# Patient Record
Sex: FEMALE | Race: WHITE | NOT HISPANIC OR LATINO | URBAN - METROPOLITAN AREA
[De-identification: names, ages, dates, MRNs, and addresses within clinical notes are randomized per-mention and may not be internally consistent; named-entity substitution may affect disease eponyms.]

---

## 2020-07-22 ENCOUNTER — OFFICE VISIT (OUTPATIENT)
Dept: URBAN - METROPOLITAN AREA TELEHEALTH 2 | Facility: TELEHEALTH | Age: 65
End: 2020-07-22

## 2020-07-22 ENCOUNTER — OFFICE VISIT (OUTPATIENT)
Dept: URBAN - METROPOLITAN AREA CLINIC 63 | Facility: CLINIC | Age: 65
End: 2020-07-22

## 2020-10-01 ENCOUNTER — OFFICE VISIT (OUTPATIENT)
Dept: URBAN - METROPOLITAN AREA CLINIC 121 | Facility: CLINIC | Age: 65
End: 2020-10-01

## 2020-10-29 ENCOUNTER — OFFICE VISIT (OUTPATIENT)
Dept: URBAN - METROPOLITAN AREA CLINIC 63 | Facility: CLINIC | Age: 65
End: 2020-10-29

## 2020-11-05 ENCOUNTER — OFFICE VISIT (OUTPATIENT)
Dept: URBAN - METROPOLITAN AREA TELEHEALTH 2 | Facility: TELEHEALTH | Age: 65
End: 2020-11-05

## 2020-11-09 LAB
ABSOLUTE BASOPHILS: (no result)
ABSOLUTE EOSINOPHILS: (no result)
ABSOLUTE LYMPHOCYTES: (no result)
ABSOLUTE MONOCYTES: (no result)
ABSOLUTE NEUTROPHILS: (no result)
BASOPHILS: (no result)
EOSINOPHILS: (no result)
HEMATOCRIT: (no result)
HEMOGLOBIN: (no result)
LYMPHOCYTES: (no result)
MCH: (no result)
MCHC: (no result)
MCV: (no result)
MONOCYTES: (no result)
MPV: (no result)
NEUTROPHILS: (no result)
PLATELET COUNT: (no result)
RDW: (no result)
RED BLOOD CELL COUNT: (no result)
WHITE BLOOD CELL COUNT: (no result)

## 2021-01-28 ENCOUNTER — OFFICE VISIT (OUTPATIENT)
Dept: URBAN - METROPOLITAN AREA CLINIC 63 | Facility: CLINIC | Age: 66
End: 2021-01-28

## 2021-04-01 ENCOUNTER — OFFICE VISIT (OUTPATIENT)
Dept: URBAN - METROPOLITAN AREA CLINIC 63 | Facility: CLINIC | Age: 66
End: 2021-04-01

## 2021-04-23 ENCOUNTER — OFFICE VISIT (OUTPATIENT)
Dept: URBAN - METROPOLITAN AREA SURGERY CENTER 4 | Facility: SURGERY CENTER | Age: 66
End: 2021-04-23

## 2021-04-27 LAB — PATHOLOGY (INDENTED REPORT): (no result)

## 2021-05-13 ENCOUNTER — OFFICE VISIT (OUTPATIENT)
Dept: URBAN - METROPOLITAN AREA CLINIC 63 | Facility: CLINIC | Age: 66
End: 2021-05-13

## 2021-10-19 ENCOUNTER — OFFICE VISIT (OUTPATIENT)
Dept: URBAN - METROPOLITAN AREA CLINIC 121 | Facility: CLINIC | Age: 66
End: 2021-10-19

## 2022-02-24 ENCOUNTER — OFFICE VISIT (OUTPATIENT)
Dept: URBAN - METROPOLITAN AREA CLINIC 63 | Facility: CLINIC | Age: 67
End: 2022-02-24

## 2022-07-09 ENCOUNTER — TELEPHONE ENCOUNTER (OUTPATIENT)
Dept: URBAN - METROPOLITAN AREA CLINIC 121 | Facility: CLINIC | Age: 67
End: 2022-07-09

## 2022-07-09 RX ORDER — ROSUVASTATIN CALCIUM 20 MG/1
TABLET, FILM COATED ORAL ONCE A DAY
Refills: 0 | OUTPATIENT
Start: 2020-06-02 | End: 2022-02-24

## 2022-07-09 RX ORDER — LIDOCAINE 50 MG/G
TAKE AS DIRECTED CREAM TOPICAL TAKE AS DIRECTED
Refills: 0 | OUTPATIENT
Start: 2020-10-29 | End: 2022-02-24

## 2022-07-09 RX ORDER — FLUOXETINE HYDROCHLORIDE 20 MG/1
TABLET ORAL
Refills: 0 | OUTPATIENT
Start: 2019-11-04 | End: 2019-12-23

## 2022-07-09 RX ORDER — AMLODIPINE BESYLATE 2.5 MG/1
TABLET ORAL
Refills: 0 | OUTPATIENT
Start: 2019-11-06 | End: 2019-12-23

## 2022-07-09 RX ORDER — VALACYCLOVIR HYDROCHLORIDE 500 MG/1
PRN TABLET, FILM COATED ORAL
Refills: 0 | OUTPATIENT
Start: 2019-12-23 | End: 2020-10-29

## 2022-07-09 RX ORDER — OMEPRAZOLE 20 MG/1
ONCE A DAY ONE CAPSULE 30MIN BEFORE BREAKFAST CAPSULE, DELAYED RELEASE ORAL ONCE A DAY
Refills: 5 | OUTPATIENT
Start: 2021-05-13 | End: 2021-12-13

## 2022-07-09 RX ORDER — EZETIMIBE 10 MG/1
TABLET ORAL ONCE A DAY
Refills: 0 | OUTPATIENT
Start: 2020-06-02 | End: 2022-02-24

## 2022-07-09 RX ORDER — AMLODIPINE BESYLATE 2.5 MG/1
TABLET ORAL ONCE A DAY
Refills: 0 | OUTPATIENT
Start: 2020-05-29 | End: 2022-02-24

## 2022-07-09 RX ORDER — FAMOTIDINE 40 MG/1
TWICE A DAY TABLET, FILM COATED ORAL TWICE A DAY
Refills: 1 | OUTPATIENT
Start: 2020-07-27 | End: 2021-03-08

## 2022-07-09 RX ORDER — FLUOXETINE HYDROCHLORIDE 20 MG/1
CAPSULE ORAL ONCE A DAY
Refills: 0 | OUTPATIENT
Start: 2020-07-25 | End: 2022-02-24

## 2022-07-09 RX ORDER — VALACYCLOVIR HYDROCHLORIDE 500 MG/1
PRN TABLET, FILM COATED ORAL
Refills: 0 | OUTPATIENT
Start: 2019-12-23 | End: 2019-12-23

## 2022-07-09 RX ORDER — PANTOPRAZOLE 20 MG/1
TABLET, DELAYED RELEASE ORAL
Refills: 0 | OUTPATIENT
Start: 2019-11-06 | End: 2019-12-23

## 2022-07-09 RX ORDER — OMEPRAZOLE 20 MG/1
ONCE A DAY ONE CAPSULE 30MIN BEFORE BREAKFAST CAPSULE, DELAYED RELEASE ORAL ONCE A DAY
Refills: 1 | OUTPATIENT
Start: 2021-04-23 | End: 2021-05-13

## 2022-07-10 ENCOUNTER — TELEPHONE ENCOUNTER (OUTPATIENT)
Dept: URBAN - METROPOLITAN AREA CLINIC 121 | Facility: CLINIC | Age: 67
End: 2022-07-10

## 2022-07-10 RX ORDER — FAMOTIDINE 40 MG/1
TAKE ONE TABLET BY MOUTH TWICE DAILY TABLET, FILM COATED ORAL TWICE A DAY
Refills: 0 | Status: ACTIVE | COMMUNITY
Start: 2021-03-08

## 2022-07-10 RX ORDER — VALACYCLOVIR HYDROCHLORIDE 500 MG/1
TABLET, FILM COATED ORAL AS NEEDED
Refills: 0 | Status: ACTIVE | COMMUNITY
Start: 2022-02-24

## 2022-07-10 RX ORDER — FLUOXETINE HYDROCHLORIDE 20 MG/1
TABLET ORAL
Refills: 0 | Status: ACTIVE | COMMUNITY
Start: 2019-12-23

## 2022-07-10 RX ORDER — EZETIMIBE 10 MG/1
TABLET ORAL ONCE A DAY
Refills: 0 | Status: ACTIVE | COMMUNITY
Start: 2022-02-24

## 2022-07-10 RX ORDER — AMLODIPINE BESYLATE 2.5 MG/1
TABLET ORAL
Refills: 0 | Status: ACTIVE | COMMUNITY
Start: 2019-12-23

## 2022-07-10 RX ORDER — FLUOXETINE HYDROCHLORIDE 20 MG/1
CAPSULE ORAL ONCE A DAY
Refills: 0 | Status: ACTIVE | COMMUNITY
Start: 2022-02-24

## 2022-07-10 RX ORDER — OMEPRAZOLE 20 MG/1
CAPSULE, DELAYED RELEASE ORAL ONCE A DAY
Refills: 0 | Status: ACTIVE | COMMUNITY
Start: 2022-02-24

## 2022-07-10 RX ORDER — AMLODIPINE BESYLATE 2.5 MG/1
TABLET ORAL ONCE A DAY
Refills: 0 | Status: ACTIVE | COMMUNITY
Start: 2022-02-24

## 2022-07-10 RX ORDER — PANTOPRAZOLE 20 MG/1
TABLET, DELAYED RELEASE ORAL
Refills: 0 | Status: ACTIVE | COMMUNITY
Start: 2019-12-23

## 2022-07-10 RX ORDER — FAMOTIDINE 20 MG/1
ONCE A DAY TABLET ORAL ONCE A DAY
Refills: 1 | Status: ACTIVE | COMMUNITY
Start: 2019-12-23

## 2022-07-10 RX ORDER — OMEPRAZOLE 20 MG/1
ONCE A DAY CAPSULE, DELAYED RELEASE ORAL ONCE A DAY
Refills: 0 | Status: ACTIVE | COMMUNITY
Start: 2021-12-13

## 2022-07-10 RX ORDER — ROSUVASTATIN CALCIUM 20 MG/1
TABLET, FILM COATED ORAL ONCE A DAY
Refills: 0 | Status: ACTIVE | COMMUNITY
Start: 2022-02-24

## 2022-07-10 RX ORDER — ONDANSETRON HYDROCHLORIDE 4 MG/1
TAKE ONE TABLET 30 MINUTES BEFORE EACH BOTTLE OF MAGNESIUM CITRATE FOR COLONOSCOPY PREP TABLET, FILM COATED ORAL
Refills: 0 | Status: ACTIVE | COMMUNITY
Start: 2019-12-23

## 2023-07-24 PROBLEM — 266433003: Status: ACTIVE | Noted: 2023-07-24

## 2023-07-26 ENCOUNTER — LAB OUTSIDE AN ENCOUNTER (OUTPATIENT)
Dept: URBAN - METROPOLITAN AREA CLINIC 63 | Facility: CLINIC | Age: 68
End: 2023-07-26

## 2023-07-26 ENCOUNTER — OFFICE VISIT (OUTPATIENT)
Dept: URBAN - METROPOLITAN AREA CLINIC 63 | Facility: CLINIC | Age: 68
End: 2023-07-26
Payer: COMMERCIAL

## 2023-07-26 VITALS
WEIGHT: 208 LBS | BODY MASS INDEX: 33.43 KG/M2 | OXYGEN SATURATION: 94 % | DIASTOLIC BLOOD PRESSURE: 90 MMHG | HEART RATE: 86 BPM | SYSTOLIC BLOOD PRESSURE: 130 MMHG | HEIGHT: 66 IN | TEMPERATURE: 96 F

## 2023-07-26 DIAGNOSIS — K21.00 GASTROESOPHAGEAL REFLUX DISEASE WITH ESOPHAGITIS WITHOUT HEMORRHAGE: ICD-10-CM

## 2023-07-26 DIAGNOSIS — K44.9 HIATAL HERNIA: ICD-10-CM

## 2023-07-26 PROCEDURE — 99213 OFFICE O/P EST LOW 20 MIN: CPT | Performed by: INTERNAL MEDICINE

## 2023-07-26 RX ORDER — EZETIMIBE 10 MG/1
TABLET ORAL ONCE A DAY
Refills: 0 | Status: ACTIVE | COMMUNITY
Start: 2022-02-24

## 2023-07-26 RX ORDER — ROSUVASTATIN CALCIUM 20 MG/1
TABLET, FILM COATED ORAL ONCE A DAY
Refills: 0 | Status: ACTIVE | COMMUNITY
Start: 2022-02-24

## 2023-07-26 RX ORDER — FLUOXETINE HYDROCHLORIDE 20 MG/1
CAPSULE ORAL ONCE A DAY
Refills: 0 | Status: ACTIVE | COMMUNITY
Start: 2022-02-24

## 2023-07-26 RX ORDER — VALACYCLOVIR HYDROCHLORIDE 500 MG/1
TABLET, FILM COATED ORAL AS NEEDED
Refills: 0 | Status: ACTIVE | COMMUNITY
Start: 2022-02-24

## 2023-07-26 RX ORDER — OMEPRAZOLE 20 MG/1
ONCE A DAY CAPSULE, DELAYED RELEASE ORAL ONCE A DAY
Refills: 0 | Status: ACTIVE | COMMUNITY
Start: 2021-12-13

## 2023-07-26 NOTE — HPI-TODAY'S VISIT:
Glenny is a pleasant 68-year-old female who presents today for a follow-up.  Chronic reflux well controlled on omeprazole 20 mg every other day. Inability to wean off PPI therapy.  Being evaluated for small hiatal hernia and esophagitis   EGD dated 4/23/2021 demonstrated LA grade A esophagitis.  Medium size hiatal hernia.  Gastritis.  Normal third portion of the duodenum.  A single gastric polyp biopsied. Pathology demonstrated gastric mucosa without specific histopathologic findings.  Negative for H. pylori.  Benign fundic gland polyp.  Benign lower third esophageal biopsies.  Started on omeprazole 20 mg once daily    Colonoscopy in January 2020 revealing diverticulosis in the sigmoid colon and hemorrhoids.  Placed into 5-year recall  Patient state in June had 2 weeks of shortness of breath , stating she had desire to take deep breath. States over the course of   that time frame had more exertioinal dyspnea. Was inquiring if hiatal hernia could be responsible Takes omeprazole 20mg daily with good control of of acid reflux.Denies dysphagia, wt loss, rectal bleeding, melena. Admits that if she doesnt take PPI reflux returns Denies family hx of CRC or polyps.

## 2023-09-13 ENCOUNTER — WEB ENCOUNTER (OUTPATIENT)
Dept: URBAN - METROPOLITAN AREA CLINIC 63 | Facility: CLINIC | Age: 68
End: 2023-09-13

## 2023-09-13 ENCOUNTER — OFFICE VISIT (OUTPATIENT)
Dept: URBAN - METROPOLITAN AREA CLINIC 63 | Facility: CLINIC | Age: 68
End: 2023-09-13
Payer: COMMERCIAL

## 2023-09-13 VITALS
DIASTOLIC BLOOD PRESSURE: 80 MMHG | HEART RATE: 78 BPM | WEIGHT: 209.8 LBS | HEIGHT: 66 IN | OXYGEN SATURATION: 93 % | SYSTOLIC BLOOD PRESSURE: 120 MMHG | BODY MASS INDEX: 33.72 KG/M2 | TEMPERATURE: 97.4 F

## 2023-09-13 DIAGNOSIS — K21.00 GASTROESOPHAGEAL REFLUX DISEASE WITH ESOPHAGITIS WITHOUT HEMORRHAGE: ICD-10-CM

## 2023-09-13 DIAGNOSIS — K44.9 HIATAL HERNIA: ICD-10-CM

## 2023-09-13 PROCEDURE — 99213 OFFICE O/P EST LOW 20 MIN: CPT | Performed by: INTERNAL MEDICINE

## 2023-09-13 RX ORDER — EZETIMIBE 10 MG/1
TABLET ORAL ONCE A DAY
Refills: 0 | Status: ACTIVE | COMMUNITY
Start: 2022-02-24

## 2023-09-13 RX ORDER — VALACYCLOVIR HYDROCHLORIDE 500 MG/1
TABLET, FILM COATED ORAL AS NEEDED
Refills: 0 | Status: ACTIVE | COMMUNITY
Start: 2022-02-24

## 2023-09-13 RX ORDER — ROSUVASTATIN CALCIUM 20 MG/1
TABLET, FILM COATED ORAL ONCE A DAY
Refills: 0 | Status: ACTIVE | COMMUNITY
Start: 2022-02-24

## 2023-09-13 RX ORDER — OMEPRAZOLE 20 MG/1
ONCE A DAY CAPSULE, DELAYED RELEASE ORAL ONCE A DAY
Refills: 0 | Status: ACTIVE | COMMUNITY
Start: 2021-12-13

## 2023-09-13 RX ORDER — FAMOTIDINE 20 MG/1
1 TABLET IN AM TABLET, FILM COATED ORAL ONCE A DAY
Qty: 30 TABLET | Refills: 2 | OUTPATIENT
Start: 2023-09-13

## 2023-09-13 RX ORDER — FLUOXETINE HYDROCHLORIDE 20 MG/1
CAPSULE ORAL ONCE A DAY
Refills: 0 | Status: ACTIVE | COMMUNITY
Start: 2022-02-24

## 2023-09-13 NOTE — HPI-TODAY'S VISIT:
Glenny is a pleasant 68-year-old female who presents today for a follow-up.  Last visit noted in June had 2 weeks of shortness of breath stating she had desire to take a deep breath.  Over the course of that timeframe had more exertional dyspnea.  Was inquiring if hiatal hernia could be responsible.  Taking omeprazole 20 mg daily with good control of acid reflux.  Admits that if she does not take PPI, reflux returns.  Referred to cardiology to rule out cardiac source of dyspnea.  Upper GI series dated 7/27/2023 showed normal esophagram.  Small hiatal hernia.  No reflux.  Normal appearance of the stomach and proximal small bowel.   EGD dated 4/23/2021 demonstrated LA grade A esophagitis.  Medium size hiatal hernia.  Gastritis.  Normal third portion of the duodenum.  A single gastric polyp biopsied. Pathology demonstrated gastric mucosa without specific histopathologic findings.  Negative for H. pylori.  Benign fundic gland polyp.  Benign lower third esophageal biopsies.  Started on omeprazole 20 mg once daily    Colonoscopy in January 2020 revealing diverticulosis in the sigmoid colon and hemorrhoids.  Placed into 5-year recall  Patient states omeprazole 20mg daily with good control of acid reflux. Denies rectal  bleeding, constipation,melena or wt loss ECHO sched in 2 weeks and stress test soon after

## 2023-09-22 ENCOUNTER — TELEPHONE ENCOUNTER (OUTPATIENT)
Dept: URBAN - METROPOLITAN AREA CLINIC 63 | Facility: CLINIC | Age: 68
End: 2023-09-22

## 2023-09-22 RX ORDER — FAMOTIDINE 40 MG/1
1 TABLET IN AM TABLET, FILM COATED ORAL ONCE A DAY
Qty: 30 TABLET | Refills: 2
Start: 2023-09-13

## 2023-10-03 ENCOUNTER — TELEPHONE ENCOUNTER (OUTPATIENT)
Dept: URBAN - METROPOLITAN AREA CLINIC 64 | Facility: CLINIC | Age: 68
End: 2023-10-03

## 2023-12-13 ENCOUNTER — OFFICE VISIT (OUTPATIENT)
Dept: URBAN - METROPOLITAN AREA CLINIC 63 | Facility: CLINIC | Age: 68
End: 2023-12-13
Payer: COMMERCIAL

## 2023-12-13 VITALS
TEMPERATURE: 97 F | BODY MASS INDEX: 33.7 KG/M2 | DIASTOLIC BLOOD PRESSURE: 60 MMHG | RESPIRATION RATE: 20 BRPM | OXYGEN SATURATION: 96 % | WEIGHT: 209.7 LBS | HEIGHT: 66 IN | SYSTOLIC BLOOD PRESSURE: 130 MMHG | HEART RATE: 67 BPM

## 2023-12-13 DIAGNOSIS — K44.9 HIATAL HERNIA: ICD-10-CM

## 2023-12-13 DIAGNOSIS — K21.00 GASTROESOPHAGEAL REFLUX DISEASE WITH ESOPHAGITIS WITHOUT HEMORRHAGE: ICD-10-CM

## 2023-12-13 PROCEDURE — 99214 OFFICE O/P EST MOD 30 MIN: CPT | Performed by: INTERNAL MEDICINE

## 2023-12-13 RX ORDER — FAMOTIDINE 40 MG/1
1 TABLET IN AM TABLET, FILM COATED ORAL ONCE A DAY
Qty: 30 TABLET | Refills: 2 | Status: ACTIVE | COMMUNITY
Start: 2023-09-13

## 2023-12-13 RX ORDER — VALACYCLOVIR HYDROCHLORIDE 500 MG/1
TABLET, FILM COATED ORAL AS NEEDED
Refills: 0 | Status: ACTIVE | COMMUNITY
Start: 2022-02-24

## 2023-12-13 RX ORDER — FAMOTIDINE 40 MG/1
1 TABLET IN AM ONE TABLET BEFORE DINNER TABLET, FILM COATED ORAL ONCE A DAY
Qty: 60 | Refills: 2 | OUTPATIENT
Start: 2023-12-13

## 2023-12-13 RX ORDER — OMEPRAZOLE 20 MG/1
ONCE A DAY CAPSULE, DELAYED RELEASE ORAL ONCE A DAY
Refills: 0 | Status: ACTIVE | COMMUNITY
Start: 2021-12-13

## 2023-12-13 RX ORDER — FLUOXETINE HYDROCHLORIDE 20 MG/1
CAPSULE ORAL ONCE A DAY
Refills: 0 | Status: ACTIVE | COMMUNITY
Start: 2022-02-24

## 2023-12-13 RX ORDER — ROSUVASTATIN CALCIUM 20 MG/1
TABLET, FILM COATED ORAL ONCE A DAY
Refills: 0 | Status: ACTIVE | COMMUNITY
Start: 2022-02-24

## 2023-12-13 RX ORDER — EZETIMIBE 10 MG/1
TABLET ORAL ONCE A DAY
Refills: 0 | Status: ACTIVE | COMMUNITY
Start: 2022-02-24

## 2023-12-13 NOTE — HPI-TODAY'S VISIT:
Glenny is a pleasant 68-year-old female who presents today for a follow-up.  Last visit noted omeprazole 20 mg daily with good control of acid reflux.  Following with cardiology for further work-up.  She was tapered off PPI in favor of famotidine 20 mgUpper GI series dated 7/27/2023 showed normal esophagram.  Small hiatal hernia.  No reflux.  Normal appearance of the stomach and proximal small bowel. EGD dated 4/23/2021 demonstrated LA grade A esophagitis.  Medium size hiatal hernia.  Gastritis.  Normal third portion of the duodenum.  A single gastric polyp biopsied. Pathology demonstrated gastric mucosa without specific histopathologic findings.  Negative for H. pylori.  Benign fundic gland polyp.  Benign lower third esophageal biopsies.  Started on omeprazole 20 mg once daily  Colonoscopy in January 2020 revealing diverticulosis in the sigmoid colon and hemorrhoids.  Placed into 5-year recall  Patient denies heartburn controlled with omeprazole 20mg daily. Occasional minimal dysphagia to meats or sharp foods( tostitos) Denies change in bowel habits, rectal bleeding , wt loss.

## 2024-04-24 ENCOUNTER — OV EP (OUTPATIENT)
Dept: URBAN - METROPOLITAN AREA CLINIC 63 | Facility: CLINIC | Age: 69
End: 2024-04-24
Payer: COMMERCIAL

## 2024-04-24 VITALS
HEIGHT: 66 IN | DIASTOLIC BLOOD PRESSURE: 83 MMHG | TEMPERATURE: 97.1 F | BODY MASS INDEX: 32.62 KG/M2 | OXYGEN SATURATION: 95 % | RESPIRATION RATE: 20 BRPM | SYSTOLIC BLOOD PRESSURE: 120 MMHG | WEIGHT: 203 LBS | HEART RATE: 70 BPM

## 2024-04-24 DIAGNOSIS — K44.9 HIATAL HERNIA: ICD-10-CM

## 2024-04-24 DIAGNOSIS — K21.00 GASTROESOPHAGEAL REFLUX DISEASE WITH ESOPHAGITIS WITHOUT HEMORRHAGE: ICD-10-CM

## 2024-04-24 PROCEDURE — 99213 OFFICE O/P EST LOW 20 MIN: CPT | Performed by: INTERNAL MEDICINE

## 2024-04-24 RX ORDER — ROSUVASTATIN CALCIUM 20 MG/1
TABLET, FILM COATED ORAL ONCE A DAY
Refills: 0 | Status: ACTIVE | COMMUNITY
Start: 2022-02-24

## 2024-04-24 RX ORDER — OMEPRAZOLE 20 MG/1
ONCE A DAY CAPSULE, DELAYED RELEASE ORAL ONCE A DAY
Refills: 0 | Status: ACTIVE | COMMUNITY
Start: 2021-12-13

## 2024-04-24 RX ORDER — FLUOXETINE HYDROCHLORIDE 20 MG/1
CAPSULE ORAL ONCE A DAY
Refills: 0 | Status: ACTIVE | COMMUNITY
Start: 2022-02-24

## 2024-04-24 RX ORDER — FAMOTIDINE 40 MG/1
1 TABLET IN AM ONE TABLET BEFORE DINNER TABLET, FILM COATED ORAL ONCE A DAY
Qty: 60 | Refills: 2 | Status: ACTIVE | COMMUNITY
Start: 2023-12-13

## 2024-04-24 RX ORDER — EZETIMIBE 10 MG/1
TABLET ORAL ONCE A DAY
Refills: 0 | Status: ACTIVE | COMMUNITY
Start: 2022-02-24

## 2024-04-24 RX ORDER — VALACYCLOVIR HYDROCHLORIDE 500 MG/1
TABLET, FILM COATED ORAL AS NEEDED
Refills: 0 | Status: ACTIVE | COMMUNITY
Start: 2022-02-24

## 2024-04-24 NOTE — HPI-TODAY'S VISIT:
Glenny is a pleasant 69-year-old female who presents today for a follow-up.  Last visit was taking omeprazole 20 mg daily with good control of acid reflux.  Following with cardiology for further work-up.  She was tapered off PPI in favor of famotidine 40 mg   Upper GI series dated 7/27/2023 showed normal esophagram.  Small hiatal hernia.  No reflux.  Normal appearance of the stomach and proximal small bowel.   EGD dated 4/23/2021 demonstrated LA grade A esophagitis.  Medium size hiatal hernia.  Gastritis.  Normal third portion of the duodenum.  A single gastric polyp biopsied. Pathology demonstrated gastric mucosa without specific histopathologic findings.  Negative for H. pylori.  Benign fundic gland polyp.  Benign lower third esophageal biopsies.  Started on omeprazole 20 mg once daily    Colonoscopy in January 2020 revealing diverticulosis in the sigmoid colon and hemorrhoids.  Placed into 5-year recall Patient doing well, denies heartburn, dysphagia, wt loss,diarrhea, bleeding or abd pain. Takes famotidine 40mg BID

## 2024-06-03 ENCOUNTER — TELEPHONE ENCOUNTER (OUTPATIENT)
Dept: URBAN - METROPOLITAN AREA CLINIC 63 | Facility: CLINIC | Age: 69
End: 2024-06-03

## 2024-06-03 ENCOUNTER — ERX REFILL RESPONSE (OUTPATIENT)
Dept: URBAN - METROPOLITAN AREA CLINIC 63 | Facility: CLINIC | Age: 69
End: 2024-06-03

## 2024-06-03 RX ORDER — FAMOTIDINE 40 MG/1
1 TABLET IN AM ONE TABLET BEFORE DINNER TABLET, FILM COATED ORAL ONCE A DAY
Qty: 90 | Refills: 0

## 2024-06-03 RX ORDER — FAMOTIDINE 40 MG/1
1 TABLET IN AM ONE TABLET BEFORE DINNER TABLET, FILM COATED ORAL ONCE A DAY
Qty: 60 | Refills: 2 | OUTPATIENT

## 2024-09-25 PROBLEM — 305058001: Status: ACTIVE | Noted: 2024-09-25

## 2024-09-26 ENCOUNTER — DASHBOARD ENCOUNTERS (OUTPATIENT)
Age: 69
End: 2024-09-26

## 2024-09-26 ENCOUNTER — OFFICE VISIT (OUTPATIENT)
Dept: URBAN - METROPOLITAN AREA CLINIC 63 | Facility: CLINIC | Age: 69
End: 2024-09-26
Payer: COMMERCIAL

## 2024-09-26 VITALS
HEART RATE: 87 BPM | SYSTOLIC BLOOD PRESSURE: 128 MMHG | TEMPERATURE: 97.9 F | OXYGEN SATURATION: 96 % | DIASTOLIC BLOOD PRESSURE: 76 MMHG | BODY MASS INDEX: 31.88 KG/M2 | WEIGHT: 198.4 LBS | HEIGHT: 66 IN

## 2024-09-26 DIAGNOSIS — Z12.11 SCREEN FOR COLON CANCER: ICD-10-CM

## 2024-09-26 DIAGNOSIS — K21.00 GASTROESOPHAGEAL REFLUX DISEASE WITH ESOPHAGITIS WITHOUT HEMORRHAGE: ICD-10-CM

## 2024-09-26 DIAGNOSIS — E66.3 OVERWEIGHT (BMI 25.0-29.9): ICD-10-CM

## 2024-09-26 PROBLEM — 162863004: Status: ACTIVE | Noted: 2024-09-26

## 2024-09-26 PROCEDURE — 99213 OFFICE O/P EST LOW 20 MIN: CPT | Performed by: INTERNAL MEDICINE

## 2024-09-26 RX ORDER — FAMOTIDINE 40 MG/1
1 TABLET IN AM ONE TABLET BEFORE DINNER TABLET, FILM COATED ORAL ONCE A DAY
Qty: 90 | Refills: 0

## 2024-09-26 RX ORDER — FLUOXETINE HYDROCHLORIDE 20 MG/1
CAPSULE ORAL ONCE A DAY
Refills: 0 | Status: ACTIVE | COMMUNITY
Start: 2022-02-24

## 2024-09-26 RX ORDER — OMEPRAZOLE 20 MG/1
ONCE A DAY CAPSULE, DELAYED RELEASE ORAL ONCE A DAY
Refills: 0 | Status: ACTIVE | COMMUNITY
Start: 2021-12-13

## 2024-09-26 RX ORDER — VALACYCLOVIR HYDROCHLORIDE 500 MG/1
TABLET, FILM COATED ORAL AS NEEDED
Refills: 0 | Status: ACTIVE | COMMUNITY
Start: 2022-02-24

## 2024-09-26 RX ORDER — EZETIMIBE 10 MG/1
TABLET ORAL ONCE A DAY
Refills: 0 | Status: ACTIVE | COMMUNITY
Start: 2022-02-24

## 2024-09-26 RX ORDER — ROSUVASTATIN CALCIUM 20 MG/1
TABLET, FILM COATED ORAL ONCE A DAY
Refills: 0 | Status: ACTIVE | COMMUNITY
Start: 2022-02-24

## 2024-09-26 RX ORDER — FAMOTIDINE 40 MG/1
1 TABLET IN AM ONE TABLET BEFORE DINNER TABLET, FILM COATED ORAL ONCE A DAY
Qty: 90 | Refills: 0 | Status: ACTIVE | COMMUNITY

## 2024-09-26 NOTE — HPI-TODAY'S VISIT:
Glenny is a pleasant 69-year-old female who is previously known to my associate Dr. Rivas is here today in follow-up. She is doing well from a GI standpoint. During her last visit with Dr. Villanueva he instructed her to stop her omeprazole and prescribed famotidine 40 mg twice daily for her acid reflux symptoms. Unfortunately this is not controlling her acid reflux and omeprazole 20 mg has afforded her good relief from her reflux symptoms in the past. She is otherwise asymptomatic. Denies any dysphagia abdominal pain early satiety. Denies any constipation diarrhea rectal bleeding or melena or any unintentional weight loss loss of appetite. We discussed about the adverse effects of PPIs. I recommended she continue famotidine twice a day and take omeprazole as frequently as her symptoms demanded. She was reassured. She is due for colonoscopy next year.    EGD dated 4/23/2021 demonstrated LA grade A esophagitis.  Medium size hiatal hernia.  Gastritis.  Normal third portion of the duodenum.  A single gastric polyp biopsied. Pathology demonstrated gastric mucosa without specific histopathologic findings.  Negative for H. pylori.  Benign fundic gland polyp.  Benign lower third esophageal biopsies.  Started on omeprazole 20 mg once daily    Colonoscopy in January 2020 revealing diverticulosis in the sigmoid colon and hemorrhoids.  Placed into 5-year recall

## 2024-09-26 NOTE — HPI-PREVIOUS PROCEDURES
EGD dated 4/23/2021 demonstrated LA grade A esophagitis.  Medium size hiatal hernia.  Gastritis.  Normal third portion of the duodenum.  A single gastric polyp biopsied. Pathology demonstrated gastric mucosa without specific histopathologic findings.  Negative for H. pylori.  Benign fundic gland polyp.  Benign lower third esophageal biopsies.  Upper endoscopy 2013 Dr. Anderson: Several tongues of erythematous mucosa extending proximally in the distal esophagus biopsies negative for Lam's, multiple polyps in the gastric fundus and body biopsied to be fundic gland polyps, otherwise normal stomach, normal duodenum. Colonoscopy 2020: Diverticulosis and hemorrhoids no polyps. No specimens collected - 5 y recall

## 2024-09-26 NOTE — HPI-PREVIOUS IMAGING
Upper GI series dated 7/27/2023 showed normal esophagram.  Small hiatal hernia.  No reflux.  Normal appearance of the stomach and proximal small bowel.

## 2024-09-26 NOTE — HPI-PREVIOUS LABS
Labs July 2024: BUN 21 creatinine 0.97, normal liver enzymes except ALT 30 (H), serum albumin 4.3, hemoglobin A1c 6.3 (H), normal lipid panel, TSH 2.34,

## 2025-02-13 ENCOUNTER — LAB OUTSIDE AN ENCOUNTER (OUTPATIENT)
Dept: URBAN - METROPOLITAN AREA CLINIC 63 | Facility: CLINIC | Age: 70
End: 2025-02-13

## 2025-02-13 ENCOUNTER — OFFICE VISIT (OUTPATIENT)
Dept: URBAN - METROPOLITAN AREA CLINIC 63 | Facility: CLINIC | Age: 70
End: 2025-02-13
Payer: MEDICARE

## 2025-02-13 VITALS
WEIGHT: 197 LBS | DIASTOLIC BLOOD PRESSURE: 70 MMHG | HEART RATE: 73 BPM | OXYGEN SATURATION: 96 % | SYSTOLIC BLOOD PRESSURE: 125 MMHG | HEIGHT: 66 IN | TEMPERATURE: 97.8 F | BODY MASS INDEX: 31.66 KG/M2

## 2025-02-13 DIAGNOSIS — Z86.0102 HISTORY OF HYPERPLASTIC POLYP OF COLON: ICD-10-CM

## 2025-02-13 DIAGNOSIS — K21.00 ALKALINE REFLUX ESOPHAGITIS: ICD-10-CM

## 2025-02-13 DIAGNOSIS — Z12.11 COLON CANCER SCREENING: ICD-10-CM

## 2025-02-13 DIAGNOSIS — R19.7 INTERMITTENT DIARRHEA: ICD-10-CM

## 2025-02-13 PROCEDURE — 99214 OFFICE O/P EST MOD 30 MIN: CPT

## 2025-02-13 RX ORDER — LOSARTAN POTASSIUM 25 MG/1
1 TABLET TABLET, FILM COATED ORAL ONCE A DAY
Status: ACTIVE | COMMUNITY

## 2025-02-13 RX ORDER — VALACYCLOVIR HYDROCHLORIDE 500 MG/1
TABLET, FILM COATED ORAL AS NEEDED
Refills: 0 | Status: ACTIVE | COMMUNITY
Start: 2022-02-24

## 2025-02-13 RX ORDER — OMEPRAZOLE 20 MG/1
ONCE A DAY CAPSULE, DELAYED RELEASE ORAL ONCE A DAY
Refills: 0 | Status: ACTIVE | COMMUNITY
Start: 2021-12-13

## 2025-02-13 RX ORDER — FAMOTIDINE 40 MG/1
1 TABLET IN AM ONE TABLET BEFORE DINNER TABLET, FILM COATED ORAL ONCE A DAY
Qty: 90 | Refills: 0 | Status: ACTIVE | COMMUNITY

## 2025-02-13 RX ORDER — EZETIMIBE 10 MG/1
TABLET ORAL ONCE A DAY
Refills: 0 | Status: ACTIVE | COMMUNITY
Start: 2022-02-24

## 2025-02-13 RX ORDER — ROSUVASTATIN CALCIUM 20 MG/1
TABLET, FILM COATED ORAL ONCE A DAY
Refills: 0 | Status: ACTIVE | COMMUNITY
Start: 2022-02-24

## 2025-02-13 RX ORDER — FLUOXETINE HYDROCHLORIDE 20 MG/1
CAPSULE ORAL ONCE A DAY
Refills: 0 | Status: ACTIVE | COMMUNITY
Start: 2022-02-24

## 2025-02-13 NOTE — HPI-TODAY'S VISIT:
Patient comes in for follow-up visit for colonoscopy repeat scheduling as well as changes in bowel habits.  She had a previous colonoscopy January 2020 with 5-year recall due to history of colon polyp noted on previous colonoscopy in 2014.  Following Thanksgiving, patient relates she had an acute GI illness with diarrhea, nausea and vomiting that resolved but she has noticed every few weeks she has 1 to 2 days of diarrhea with associated lower abdominal cramping.  In between she will have weeks with regular BMs with no pain.  Denies GI bleeding, loss of appetite, nausea, vomiting, dysphagia, unintentional weight loss.  Reflux mostly controlled on famotidine twice daily and uses omeprazole 20 mg on demand if needed.  Denies any use of blood thinners, presence of pacemaker or defib. Denies history of sleep apnea. No prior MI, TIA, CVA or cardiac stenting. No known cardiac or pulmonary issues.   Last OV 9/2024 Dr. Andersen: Glenny is a pleasant 69-year-old female who is previously known to my associate Dr. Rivas is here today in follow-up. She is doing well from a GI standpoint. During her last visit with Dr. Villanueva he instructed her to stop her omeprazole and prescribed famotidine 40 mg twice daily for her acid reflux symptoms. Unfortunately this is not controlling her acid reflux and omeprazole 20 mg has afforded her good relief from her reflux symptoms in the past. She is otherwise asymptomatic. Denies any dysphagia abdominal pain early satiety. Denies any constipation diarrhea rectal bleeding or melena or any unintentional weight loss loss of appetite. We discussed about the adverse effects of PPIs. I recommended she continue famotidine twice a day and take omeprazole as frequently as her symptoms demanded. She was reassured. She is due for colonoscopy next year.    EGD dated 4/23/2021 demonstrated LA grade A esophagitis.  Medium size hiatal hernia.  Gastritis.  Normal third portion of the duodenum.  A single gastric polyp biopsied. Pathology demonstrated gastric mucosa without specific histopathologic findings.  Negative for H. pylori.  Benign fundic gland polyp.  Benign lower third esophageal biopsies.  Started on omeprazole 20 mg once daily    Colonoscopy in January 2020 revealing diverticulosis in the sigmoid colon and hemorrhoids.  Placed into 5-year recall

## 2025-02-13 NOTE — HPI-PREVIOUS PROCEDURES
EGD dated 4/23/2021 demonstrated LA grade A esophagitis.  Medium size hiatal hernia.  Gastritis.  Normal third portion of the duodenum.  A single gastric polyp biopsied. Pathology demonstrated gastric mucosa without specific histopathologic findings.  Negative for H. pylori.  Benign fundic gland polyp.  Benign lower third esophageal biopsies.  Upper endoscopy 2013 Dr. Anderson: Several tongues of erythematous mucosa extending proximally in the distal esophagus biopsies negative for Lam's, multiple polyps in the gastric fundus and body biopsied to be fundic gland polyps, otherwise normal stomach, normal duodenum.  Colonoscopy 2020: Diverticulosis and hemorrhoids no polyps. No specimens collected - 5 y recall  Colonoscopy 2014 with Dr. Hernández-4mm polyp and 5 year recall-no path

## 2025-03-27 ENCOUNTER — OFFICE VISIT (OUTPATIENT)
Dept: URBAN - METROPOLITAN AREA CLINIC 63 | Facility: CLINIC | Age: 70
End: 2025-03-27

## 2025-03-28 ENCOUNTER — OFFICE VISIT (OUTPATIENT)
Dept: URBAN - METROPOLITAN AREA CLINIC 63 | Facility: CLINIC | Age: 70
End: 2025-03-28

## 2025-05-07 ENCOUNTER — OFFICE VISIT (OUTPATIENT)
Dept: URBAN - METROPOLITAN AREA SURGERY CENTER 4 | Facility: SURGERY CENTER | Age: 70
End: 2025-05-07

## 2025-06-05 ENCOUNTER — OFFICE VISIT (OUTPATIENT)
Dept: URBAN - METROPOLITAN AREA CLINIC 63 | Facility: CLINIC | Age: 70
End: 2025-06-05

## 2025-06-23 ENCOUNTER — CLAIMS CREATED FROM THE CLAIM WINDOW (OUTPATIENT)
Dept: URBAN - METROPOLITAN AREA CLINIC 4 | Facility: CLINIC | Age: 70
End: 2025-06-23
Payer: COMMERCIAL

## 2025-06-23 ENCOUNTER — CLAIMS CREATED FROM THE CLAIM WINDOW (OUTPATIENT)
Dept: URBAN - METROPOLITAN AREA SURGERY CENTER 4 | Facility: SURGERY CENTER | Age: 70
End: 2025-06-23
Payer: COMMERCIAL

## 2025-06-23 DIAGNOSIS — K57.30 DIVERTICULOSIS OF LARGE INTESTINE WITHOUT PERFORATION OR ABSCESS WITHOUT BLEEDING: ICD-10-CM

## 2025-06-23 DIAGNOSIS — D12.2 BENIGN NEOPLASM OF ASCENDING COLON: ICD-10-CM

## 2025-06-23 DIAGNOSIS — D12.3 BENIGN NEOPLASM OF TRANSVERSE COLON: ICD-10-CM

## 2025-06-23 DIAGNOSIS — K63.5 COLON POLYP: ICD-10-CM

## 2025-06-23 DIAGNOSIS — Z86.0100 PERSONAL HISTORY OF COLON POLYPS, UNSPECIFIED: ICD-10-CM

## 2025-06-23 DIAGNOSIS — E66.01 MORBID OBESITY: ICD-10-CM

## 2025-06-23 DIAGNOSIS — Z12.11 SCREEN FOR COLON CANCER: ICD-10-CM

## 2025-06-23 DIAGNOSIS — K63.89 OTHER SPECIFIED DISEASES OF INTESTINE: ICD-10-CM

## 2025-06-23 DIAGNOSIS — K63.5 BENIGN COLON POLYP: ICD-10-CM

## 2025-06-23 DIAGNOSIS — K64.1 SECOND DEGREE HEMORRHOIDS: ICD-10-CM

## 2025-06-23 PROCEDURE — 88305 TISSUE EXAM BY PATHOLOGIST: CPT | Performed by: PATHOLOGY

## 2025-06-23 PROCEDURE — 0529F INTRVL 3/>YR PTS CLNSCP DOCD: CPT | Performed by: INTERNAL MEDICINE

## 2025-06-23 PROCEDURE — 45385 COLONOSCOPY W/LESION REMOVAL: CPT | Performed by: INTERNAL MEDICINE

## 2025-06-23 PROCEDURE — 00812 ANES LWR INTST SCR COLSC: CPT | Performed by: NURSE ANESTHETIST, CERTIFIED REGISTERED

## 2025-06-23 PROCEDURE — 45380 COLONOSCOPY AND BIOPSY: CPT | Performed by: INTERNAL MEDICINE

## 2025-06-23 RX ORDER — LOSARTAN POTASSIUM 25 MG/1
1 TABLET TABLET, FILM COATED ORAL ONCE A DAY
Status: ACTIVE | COMMUNITY

## 2025-06-23 RX ORDER — FAMOTIDINE 40 MG/1
1 TABLET IN AM ONE TABLET BEFORE DINNER TABLET, FILM COATED ORAL ONCE A DAY
Qty: 90 | Refills: 0 | Status: ACTIVE | COMMUNITY

## 2025-06-23 RX ORDER — EZETIMIBE 10 MG/1
TABLET ORAL ONCE A DAY
Refills: 0 | Status: ACTIVE | COMMUNITY
Start: 2022-02-24

## 2025-06-23 RX ORDER — VALACYCLOVIR HYDROCHLORIDE 500 MG/1
TABLET, FILM COATED ORAL AS NEEDED
Refills: 0 | Status: ACTIVE | COMMUNITY
Start: 2022-02-24

## 2025-06-23 RX ORDER — OMEPRAZOLE 20 MG/1
ONCE A DAY CAPSULE, DELAYED RELEASE ORAL ONCE A DAY
Refills: 0 | Status: ACTIVE | COMMUNITY
Start: 2021-12-13

## 2025-06-23 RX ORDER — ROSUVASTATIN CALCIUM 20 MG/1
TABLET, FILM COATED ORAL ONCE A DAY
Refills: 0 | Status: ACTIVE | COMMUNITY
Start: 2022-02-24

## 2025-06-23 RX ORDER — FLUOXETINE HYDROCHLORIDE 20 MG/1
CAPSULE ORAL ONCE A DAY
Refills: 0 | Status: ACTIVE | COMMUNITY
Start: 2022-02-24

## 2025-07-03 ENCOUNTER — OFFICE VISIT (OUTPATIENT)
Dept: URBAN - METROPOLITAN AREA CLINIC 63 | Facility: CLINIC | Age: 70
End: 2025-07-03
Payer: COMMERCIAL

## 2025-07-03 DIAGNOSIS — K21.00 ALKALINE REFLUX ESOPHAGITIS: ICD-10-CM

## 2025-07-03 DIAGNOSIS — D36.9 TUBULAR ADENOMA: ICD-10-CM

## 2025-07-03 DIAGNOSIS — R19.8 IRREGULAR BOWEL HABITS: ICD-10-CM

## 2025-07-03 PROCEDURE — 99214 OFFICE O/P EST MOD 30 MIN: CPT

## 2025-07-03 RX ORDER — LOSARTAN POTASSIUM 25 MG/1
1 TABLET TABLET, FILM COATED ORAL ONCE A DAY
Status: ACTIVE | COMMUNITY

## 2025-07-03 RX ORDER — FLUOXETINE HYDROCHLORIDE 20 MG/1
CAPSULE ORAL ONCE A DAY
Refills: 0 | Status: ACTIVE | COMMUNITY
Start: 2022-02-24

## 2025-07-03 RX ORDER — ROSUVASTATIN CALCIUM 20 MG/1
TABLET, FILM COATED ORAL ONCE A DAY
Refills: 0 | Status: ACTIVE | COMMUNITY
Start: 2022-02-24

## 2025-07-03 RX ORDER — OMEPRAZOLE 40 MG/1
1 CAPSULE 1/2 TO 1 HOUR BEFORE MORNING MEAL CAPSULE, DELAYED RELEASE ORAL ONCE A DAY
Qty: 90 | Refills: 3 | OUTPATIENT
Start: 2025-07-03

## 2025-07-03 RX ORDER — EZETIMIBE 10 MG/1
TABLET ORAL ONCE A DAY
Refills: 0 | Status: ACTIVE | COMMUNITY
Start: 2022-02-24

## 2025-07-03 RX ORDER — VALACYCLOVIR HYDROCHLORIDE 500 MG/1
TABLET, FILM COATED ORAL AS NEEDED
Refills: 0 | Status: ACTIVE | COMMUNITY
Start: 2022-02-24

## 2025-07-03 RX ORDER — FAMOTIDINE 40 MG/1
1 TABLET IN AM ONE TABLET BEFORE DINNER TABLET, FILM COATED ORAL ONCE A DAY
Qty: 90 | Refills: 0 | Status: ACTIVE | COMMUNITY

## 2025-07-03 NOTE — HPI-TODAY'S VISIT:
Patient coming in for follow-up colonoscopy visit.  Symptoms described at last office visit have completely resolved and she is feeling well.  We reviewed the results of the colonoscopy with recommendation to repeat in 3 to 5 years given size and amount of polyps seen.  Reviewing her past colonoscopy is likely better for 5-year recall.  This is discussed with patient at today's visit.  No GI complaints.  Reflux controlled well on famotidine twice daily with occasional on-demand omeprazole 20 mg used.  OV 2/2025: Patient comes in for follow-up visit for colonoscopy repeat scheduling as well as changes in bowel habits.  She had a previous colonoscopy January 2020 with 5-year recall due to history of colon polyp noted on previous colonoscopy in 2014.  Following Thanksgiving, patient relates she had an acute GI illness with diarrhea, nausea and vomiting that resolved but she has noticed every few weeks she has 1 to 2 days of diarrhea with associated lower abdominal cramping.  In between she will have weeks with regular BMs with no pain.  Denies GI bleeding, loss of appetite, nausea, vomiting, dysphagia, unintentional weight loss.  Reflux mostly controlled on famotidine twice daily and uses omeprazole 20 mg on demand if needed.  Denies any use of blood thinners, presence of pacemaker or defib. Denies history of sleep apnea. No prior MI, TIA, CVA or cardiac stenting. No known cardiac or pulmonary issues.   Last OV 9/2024 Dr. Andersen: Glenny is a pleasant 69-year-old female who is previously known to my associate Dr. Rivas is here today in follow-up. She is doing well from a GI standpoint. During her last visit with Dr. Villanueva he instructed her to stop her omeprazole and prescribed famotidine 40 mg twice daily for her acid reflux symptoms. Unfortunately this is not controlling her acid reflux and omeprazole 20 mg has afforded her good relief from her reflux symptoms in the past. She is otherwise asymptomatic. Denies any dysphagia abdominal pain early satiety. Denies any constipation diarrhea rectal bleeding or melena or any unintentional weight loss loss of appetite. We discussed about the adverse effects of PPIs. I recommended she continue famotidine twice a day and take omeprazole as frequently as her symptoms demanded. She was reassured. She is due for colonoscopy next year.    EGD dated 4/23/2021 demonstrated LA grade A esophagitis.  Medium size hiatal hernia.  Gastritis.  Normal third portion of the duodenum.  A single gastric polyp biopsied. Pathology demonstrated gastric mucosa without specific histopathologic findings.  Negative for H. pylori.  Benign fundic gland polyp.  Benign lower third esophageal biopsies.  Started on omeprazole 20 mg once daily    Colonoscopy in January 2020 revealing diverticulosis in the sigmoid colon and hemorrhoids.  Placed into 5-year recall

## 2025-07-03 NOTE — HPI-PREVIOUS PROCEDURES
Colonoscopy 6/23/2025 by Dr. Andersen - 4 to 6 mm polyp in the ascending colon - Two 3 mm polyps in the transverse colon - 7 to 9 mm polyp in the transverse colon - Diverticulosis in the entire examined colon - External and internal nonbleeding hemorrhoids - Ascending colon polyp tubular adenoma - Transverse colon polyps tubular adenoma, prominent mucosal lymphoid aggregate as well as sessile serrated adenoma - Repeat 3 to 5 years  EGD dated 4/23/2021 demonstrated LA grade A esophagitis.  Medium size hiatal hernia.  Gastritis.  Normal third portion of the duodenum.  A single gastric polyp biopsied. Pathology demonstrated gastric mucosa without specific histopathologic findings.  Negative for H. pylori.  Benign fundic gland polyp.  Benign lower third esophageal biopsies.  Upper endoscopy 2013 Dr. Anderson: Several tongues of erythematous mucosa extending proximally in the distal esophagus biopsies negative for Lam's, multiple polyps in the gastric fundus and body biopsied to be fundic gland polyps, otherwise normal stomach, normal duodenum.  Colonoscopy 2020: Diverticulosis and hemorrhoids no polyps. No specimens collected - 5 y recall  Colonoscopy 2014 with Dr. Hernández-4mm polyp and 5 year recall-no path